# Patient Record
Sex: FEMALE | Race: WHITE | NOT HISPANIC OR LATINO | URBAN - METROPOLITAN AREA
[De-identification: names, ages, dates, MRNs, and addresses within clinical notes are randomized per-mention and may not be internally consistent; named-entity substitution may affect disease eponyms.]

---

## 2022-11-29 ENCOUNTER — APPOINTMENT (OUTPATIENT)
Dept: URBAN - METROPOLITAN AREA CLINIC 193 | Age: 72
Setting detail: DERMATOLOGY
End: 2022-11-30

## 2022-11-29 DIAGNOSIS — Z85.828 PERSONAL HISTORY OF OTHER MALIGNANT NEOPLASM OF SKIN: ICD-10-CM

## 2022-11-29 PROBLEM — C44.319 BASAL CELL CARCINOMA OF SKIN OF OTHER PARTS OF FACE: Status: ACTIVE | Noted: 2022-11-29

## 2022-11-29 PROCEDURE — OTHER MOHS SURGERY: OTHER

## 2022-11-29 PROCEDURE — 17311 MOHS 1 STAGE H/N/HF/G: CPT

## 2022-11-29 PROCEDURE — OTHER RETURN TO REFERRING PROVIDER: OTHER

## 2022-11-29 ASSESSMENT — LOCATION DETAILED DESCRIPTION DERM: LOCATION DETAILED: LEFT MEDIAL CANTHUS

## 2022-11-29 ASSESSMENT — LOCATION ZONE DERM: LOCATION ZONE: EYELID

## 2022-11-29 ASSESSMENT — LOCATION SIMPLE DESCRIPTION DERM: LOCATION SIMPLE: LEFT EYELID

## 2022-11-29 NOTE — PROCEDURE: RETURN TO REFERRING PROVIDER
Provider Name, If Known (E.G., Dr. CARLSON): Dr. Leslie, Dr. Jafef Provider Name, If Known (E.G., Dr. CARLSON): Dr. Leslie, Dr. Jaffe

## 2022-11-29 NOTE — HPI: PROCEDURE (MOHS)
Has The Growth Been Previously Biopsied?: has been previously biopsied
Body Location Override (Optional): Left upper lid

## 2023-01-26 ENCOUNTER — NEW PATIENT COMPREHENSIVE (OUTPATIENT)
Dept: URBAN - METROPOLITAN AREA CLINIC 100 | Facility: CLINIC | Age: 73
End: 2023-01-26

## 2023-01-26 DIAGNOSIS — H43.812: ICD-10-CM

## 2023-01-26 DIAGNOSIS — H16.223: ICD-10-CM

## 2023-01-26 DIAGNOSIS — H04.123: ICD-10-CM

## 2023-01-26 DIAGNOSIS — Z96.1: ICD-10-CM

## 2023-01-26 DIAGNOSIS — H52.4: ICD-10-CM

## 2023-01-26 PROCEDURE — 99204 OFFICE O/P NEW MOD 45 MIN: CPT

## 2023-01-26 PROCEDURE — 92015 DETERMINE REFRACTIVE STATE: CPT

## 2023-01-26 ASSESSMENT — KERATOMETRY
OS_K1POWER_DIOPTERS: 43.25
OD_AXISANGLE2_DEGREES: 9
OD_AXISANGLE_DEGREES: 99
OD_K1POWER_DIOPTERS: 43.50
OS_AXISANGLE_DEGREES: 80
OD_K2POWER_DIOPTERS: 44.25
OS_K2POWER_DIOPTERS: 44.75
OS_AXISANGLE2_DEGREES: 170

## 2023-01-26 ASSESSMENT — VISUAL ACUITY
OU_SC: J1+
OD_SC: 20/20-3
OS_SC: 20/25

## 2025-03-13 ENCOUNTER — ESTABLISHED COMPREHENSIVE EXAM (OUTPATIENT)
Dept: URBAN - METROPOLITAN AREA CLINIC 100 | Facility: CLINIC | Age: 75
End: 2025-03-13

## 2025-03-13 DIAGNOSIS — H43.812: ICD-10-CM

## 2025-03-13 DIAGNOSIS — H52.4: ICD-10-CM

## 2025-03-13 DIAGNOSIS — Z96.1: ICD-10-CM

## 2025-03-13 DIAGNOSIS — H04.123: ICD-10-CM

## 2025-03-13 DIAGNOSIS — H16.223: ICD-10-CM

## 2025-03-13 PROCEDURE — 92014 COMPRE OPH EXAM EST PT 1/>: CPT

## 2025-03-13 ASSESSMENT — KERATOMETRY
OD_K1POWER_DIOPTERS: 43.50
OD_AXISANGLE_DEGREES: 99
OS_K2POWER_DIOPTERS: 44.75
OS_AXISANGLE2_DEGREES: 170
OS_K1POWER_DIOPTERS: 43.25
OD_K2POWER_DIOPTERS: 44.25
OD_AXISANGLE2_DEGREES: 9
OS_AXISANGLE_DEGREES: 80

## 2025-03-13 ASSESSMENT — TONOMETRY
OS_IOP_MMHG: 20
OD_IOP_MMHG: 17

## 2025-03-13 ASSESSMENT — VISUAL ACUITY
OS_SC: 20/20
OD_SC: 20/25